# Patient Record
Sex: MALE | Race: WHITE
[De-identification: names, ages, dates, MRNs, and addresses within clinical notes are randomized per-mention and may not be internally consistent; named-entity substitution may affect disease eponyms.]

---

## 2018-01-18 ENCOUNTER — HOSPITAL ENCOUNTER (OUTPATIENT)
Dept: HOSPITAL 92 - NM | Age: 68
Discharge: HOME | End: 2018-01-18
Attending: INTERNAL MEDICINE
Payer: MEDICARE

## 2018-01-18 DIAGNOSIS — R07.9: Primary | ICD-10-CM

## 2018-01-18 PROCEDURE — A9500 TC99M SESTAMIBI: HCPCS

## 2018-01-18 PROCEDURE — 93017 CV STRESS TEST TRACING ONLY: CPT

## 2018-01-18 PROCEDURE — 78452 HT MUSCLE IMAGE SPECT MULT: CPT

## 2018-01-18 NOTE — NM
NUCLEAR MEDICINE CARDIAC STRESS TEST WITH EJECTION FRACTION:

 

HISTORY:  

Chest pain, unspecified. 

 

COMPARISON:  

Stress test dated 07/15/11. 

 

FINDINGS:

Exam was performed after the intravenous administration of 29.4 and 10.3 mCi technetium-99m sestamibi
, respectively, for stress and rest. 

 

No scar or ischemia. Normal wall motion. 

 

Calculated ejection fraction is 55%. 

 

IMPRESSION: 

Normal examination. 

 

 

POS: GREG